# Patient Record
Sex: FEMALE | Race: WHITE | NOT HISPANIC OR LATINO
[De-identification: names, ages, dates, MRNs, and addresses within clinical notes are randomized per-mention and may not be internally consistent; named-entity substitution may affect disease eponyms.]

---

## 2020-06-25 ENCOUNTER — TRANSCRIPTION ENCOUNTER (OUTPATIENT)
Age: 70
End: 2020-06-25

## 2020-07-28 ENCOUNTER — TRANSCRIPTION ENCOUNTER (OUTPATIENT)
Age: 70
End: 2020-07-28

## 2022-09-05 ENCOUNTER — EMERGENCY (EMERGENCY)
Facility: HOSPITAL | Age: 72
LOS: 0 days | Discharge: HOME | End: 2022-09-05
Attending: EMERGENCY MEDICINE
Payer: MEDICARE

## 2022-09-05 VITALS — WEIGHT: 117.07 LBS

## 2022-09-05 VITALS
HEART RATE: 64 BPM | OXYGEN SATURATION: 100 % | DIASTOLIC BLOOD PRESSURE: 84 MMHG | SYSTOLIC BLOOD PRESSURE: 161 MMHG | TEMPERATURE: 99 F | RESPIRATION RATE: 18 BRPM

## 2022-09-05 DIAGNOSIS — G43.909 MIGRAINE, UNSPECIFIED, NOT INTRACTABLE, WITHOUT STATUS MIGRAINOSUS: ICD-10-CM

## 2022-09-05 DIAGNOSIS — H54.7 UNSPECIFIED VISUAL LOSS: ICD-10-CM

## 2022-09-05 DIAGNOSIS — Z86.16 PERSONAL HISTORY OF COVID-19: ICD-10-CM

## 2022-09-05 DIAGNOSIS — I10 ESSENTIAL (PRIMARY) HYPERTENSION: ICD-10-CM

## 2022-09-05 LAB
ALBUMIN SERPL ELPH-MCNC: 3.9 G/DL — SIGNIFICANT CHANGE UP (ref 3.3–5)
ALP SERPL-CCNC: 54 U/L — SIGNIFICANT CHANGE UP (ref 40–120)
ALT FLD-CCNC: 18 U/L — SIGNIFICANT CHANGE UP (ref 12–78)
ANION GAP SERPL CALC-SCNC: 5 MMOL/L — SIGNIFICANT CHANGE UP (ref 5–17)
AST SERPL-CCNC: 16 U/L — SIGNIFICANT CHANGE UP (ref 15–37)
BASOPHILS # BLD AUTO: 0.05 K/UL — SIGNIFICANT CHANGE UP (ref 0–0.2)
BASOPHILS NFR BLD AUTO: 0.6 % — SIGNIFICANT CHANGE UP (ref 0–2)
BILIRUB SERPL-MCNC: 0.5 MG/DL — SIGNIFICANT CHANGE UP (ref 0.2–1.2)
BUN SERPL-MCNC: 20 MG/DL — SIGNIFICANT CHANGE UP (ref 7–23)
CALCIUM SERPL-MCNC: 9.6 MG/DL — SIGNIFICANT CHANGE UP (ref 8.5–10.1)
CHLORIDE SERPL-SCNC: 108 MMOL/L — SIGNIFICANT CHANGE UP (ref 96–108)
CO2 SERPL-SCNC: 27 MMOL/L — SIGNIFICANT CHANGE UP (ref 22–31)
CREAT SERPL-MCNC: 0.77 MG/DL — SIGNIFICANT CHANGE UP (ref 0.5–1.3)
EGFR: 81 ML/MIN/1.73M2 — SIGNIFICANT CHANGE UP
EOSINOPHIL # BLD AUTO: 0.16 K/UL — SIGNIFICANT CHANGE UP (ref 0–0.5)
EOSINOPHIL NFR BLD AUTO: 2.1 % — SIGNIFICANT CHANGE UP (ref 0–6)
GLUCOSE SERPL-MCNC: 88 MG/DL — SIGNIFICANT CHANGE UP (ref 70–99)
HCT VFR BLD CALC: 39.4 % — SIGNIFICANT CHANGE UP (ref 34.5–45)
HGB BLD-MCNC: 12.9 G/DL — SIGNIFICANT CHANGE UP (ref 11.5–15.5)
IMM GRANULOCYTES NFR BLD AUTO: 0.3 % — SIGNIFICANT CHANGE UP (ref 0–1.5)
LYMPHOCYTES # BLD AUTO: 2.22 K/UL — SIGNIFICANT CHANGE UP (ref 1–3.3)
LYMPHOCYTES # BLD AUTO: 28.8 % — SIGNIFICANT CHANGE UP (ref 13–44)
MCHC RBC-ENTMCNC: 29.6 PG — SIGNIFICANT CHANGE UP (ref 27–34)
MCHC RBC-ENTMCNC: 32.7 GM/DL — SIGNIFICANT CHANGE UP (ref 32–36)
MCV RBC AUTO: 90.4 FL — SIGNIFICANT CHANGE UP (ref 80–100)
MONOCYTES # BLD AUTO: 0.55 K/UL — SIGNIFICANT CHANGE UP (ref 0–0.9)
MONOCYTES NFR BLD AUTO: 7.1 % — SIGNIFICANT CHANGE UP (ref 2–14)
NEUTROPHILS # BLD AUTO: 4.7 K/UL — SIGNIFICANT CHANGE UP (ref 1.8–7.4)
NEUTROPHILS NFR BLD AUTO: 61.1 % — SIGNIFICANT CHANGE UP (ref 43–77)
PLATELET # BLD AUTO: 214 K/UL — SIGNIFICANT CHANGE UP (ref 150–400)
POTASSIUM SERPL-MCNC: 3.8 MMOL/L — SIGNIFICANT CHANGE UP (ref 3.5–5.3)
POTASSIUM SERPL-SCNC: 3.8 MMOL/L — SIGNIFICANT CHANGE UP (ref 3.5–5.3)
PROT SERPL-MCNC: 7 GM/DL — SIGNIFICANT CHANGE UP (ref 6–8.3)
RBC # BLD: 4.36 M/UL — SIGNIFICANT CHANGE UP (ref 3.8–5.2)
RBC # FLD: 12.7 % — SIGNIFICANT CHANGE UP (ref 10.3–14.5)
SODIUM SERPL-SCNC: 140 MMOL/L — SIGNIFICANT CHANGE UP (ref 135–145)
TROPONIN I, HIGH SENSITIVITY RESULT: 7.3 NG/L — SIGNIFICANT CHANGE UP
WBC # BLD: 7.7 K/UL — SIGNIFICANT CHANGE UP (ref 3.8–10.5)
WBC # FLD AUTO: 7.7 K/UL — SIGNIFICANT CHANGE UP (ref 3.8–10.5)

## 2022-09-05 PROCEDURE — 84484 ASSAY OF TROPONIN QUANT: CPT

## 2022-09-05 PROCEDURE — 36415 COLL VENOUS BLD VENIPUNCTURE: CPT

## 2022-09-05 PROCEDURE — 93005 ELECTROCARDIOGRAM TRACING: CPT

## 2022-09-05 PROCEDURE — 93010 ELECTROCARDIOGRAM REPORT: CPT

## 2022-09-05 PROCEDURE — 85025 COMPLETE CBC W/AUTO DIFF WBC: CPT

## 2022-09-05 PROCEDURE — 99284 EMERGENCY DEPT VISIT MOD MDM: CPT

## 2022-09-05 PROCEDURE — 80053 COMPREHEN METABOLIC PANEL: CPT

## 2022-09-05 RX ORDER — ACETAMINOPHEN 500 MG
650 TABLET ORAL ONCE
Refills: 0 | Status: COMPLETED | OUTPATIENT
Start: 2022-09-05 | End: 2022-09-05

## 2022-09-05 RX ADMIN — Medication 650 MILLIGRAM(S): at 19:55

## 2022-09-05 NOTE — ED PROVIDER NOTE - CARE PROVIDER_API CALL
Destinee Rinaldi (MD)  Clinical Neurophysiology; EEGEpilepsy; Neurology; Sleep Medicine  [address] [facility], [place], [place] [address]  Phone: [phone]  Fax: [phone]  Follow Up Time:

## 2022-09-05 NOTE — ED ADULT NURSE NOTE - OBJECTIVE STATEMENT
pt presents to ED for ocular migraine since 1530 x 1 hour. returned PTA. associated bilateral vision changes, R sided numbness. resolved at this time.    of migraines, HTN presents to the ED c/o ocular migraine that started at 3:30 PM today and lasted an hour and came back later. Pt described headache as pain to the front of the forehead and additionally c/o bilateral vision loss and numbness to the right side that has since resolved. came to ED for evaluation since her ocular migraines usually resolve in 30min. took ibprofen PTA.

## 2022-09-05 NOTE — ED PROVIDER NOTE - OBJECTIVE STATEMENT
73 y/o female with PMHx of migraines, HTN presents to the ED c/o ocular migraine that started at 3:30 PM today and lasted an hour and came back later. Pt described headache as pain to the front of the forehead and additionally c/o bilateral vision loss and numbness to the right side that has since resolved. Pt states that she has experienced ocular migraines before but they usually resolve within 30 minutes. Pt denies SOB, chest pain, and weakness in arms/legs. She took Ibuprofen PTA. She takes 2.5 mg ? for HTN. Pt had COVID a month ago and at the hospital had BP of 168, has been observing since then.

## 2022-09-05 NOTE — ED PROVIDER NOTE - NSFOLLOWUPINSTRUCTIONS_ED_ALL_ED_FT
Acute Headache    WHAT YOU NEED TO KNOW:    An acute headache is pain or discomfort that may start suddenly and get worse quickly. You may have an acute headache only when you feel stress or eat certain foods. Other acute headache pain can happen every day, and sometimes several times a day.     DISCHARGE INSTRUCTIONS:    Return to the emergency department if:   •You have severe pain.      •You have numbness or weakness on one side of your face or body.      •You have a headache that occurs after a blow to the head, a fall, or other trauma.       •You have a headache, are forgetful or confused, or have trouble speaking.      •You have a headache, stiff neck, and a fever.      Call your doctor if:   •You have a constant headache and are vomiting.      •You have a headache each day that does not get better, even after treatment.      •You have changes in your headaches, or new symptoms that occur when you have a headache.      •You have questions or concerns about your condition or care.      Medicines: You may need any of the following:   •Prescription pain medicine may be given. The medicine your healthcare provider recommends will depend on the kind of headaches you have. You will need to take prescription headache medicines as directed to prevent a problem called rebound headache. These headaches happen with regular use of pain relievers for headache disorders.      •NSAIDs, such as ibuprofen, help decrease swelling, pain, and fever. This medicine is available with or without a doctor's order. NSAIDs can cause stomach bleeding or kidney problems in certain people. If you take blood thinner medicine, always ask your healthcare provider if NSAIDs are safe for you. Always read the medicine label and follow directions.      •Acetaminophen decreases pain and fever. It is available without a doctor's order. Ask how much to take and how often to take it. Follow directions. Read the labels of all other medicines you are using to see if they also contain acetaminophen, or ask your doctor or pharmacist. Acetaminophen can cause liver damage if not taken correctly.      •Antidepressants may be given for some kinds of headaches.      •Take your medicine as directed. Contact your healthcare provider if you think your medicine is not helping or if you have side effects. Tell your provider if you are allergic to any medicine. Keep a list of the medicines, vitamins, and herbs you take. Include the amounts, and when and why you take them. Bring the list or the pill bottles to follow-up visits. Carry your medicine list with you in case of an emergency.      Manage your symptoms:   •Apply heat or ice on the headache area. Use a heat or ice pack. For an ice pack, you can also put crushed ice in a plastic bag. Cover the pack or bag with a towel before you apply it to your skin. Ice and heat both help decrease pain, and heat also helps decrease muscle spasms. Apply heat for 20 to 30 minutes every 2 hours. Apply ice for 15 to 20 minutes every hour. Apply heat or ice for as long and for as many days as directed. You may alternate heat and ice.      •Relax your muscles. Lie down in a comfortable position and close your eyes. Relax your muscles slowly. Start at your toes and work your way up your body.      •Keep a record of your headaches. Write down when your headaches start and stop. Include your symptoms and what you were doing when the headache began. Record what you ate or drank for 24 hours before the headache started. Describe the pain and where it hurts. Keep track of what you did to treat your headache and if it worked.       Prevent an acute headache:   •Avoid anything that triggers an acute headache. Examples include exposure to chemicals, going to high altitude, or not getting enough sleep. Create a regular sleep routine. Go to sleep at the same time and wake up at the same time each day. Do not use electronic devices before bedtime. These may trigger a headache or prevent you from sleeping well.      •Do not smoke. Nicotine and other chemicals in cigarettes and cigars can trigger an acute headache or make it worse. Ask your healthcare provider for information if you currently smoke and need help to quit. E-cigarettes or smokeless tobacco still contain nicotine. Talk to your healthcare provider before you use these products.       •Limit alcohol as directed. Alcohol can trigger an acute headache or make it worse. If you have cluster headaches, do not drink alcohol during an episode. For other types of headaches, ask your healthcare provider if it is safe for you to drink alcohol. Ask how much is safe for you to drink, and how often.      •Exercise as directed. Exercise can reduce tension and help with headache pain. Aim for 30 minutes of physical activity on most days of the week. Your healthcare provider can help you create an exercise plan.      •Eat a variety of healthy foods. Healthy foods include fruits, vegetables, low-fat dairy products, lean meats, fish, whole grains, and cooked beans. Your healthcare provider or dietitian can help you create meals plans if you need to avoid foods that trigger headaches.      Follow up with your healthcare provider as directed: Bring your headache record with you when you see your healthcare provider. Write down your questions so you remember to ask them during your visits.       © Copyright [employer] 2022           back to top                          © Copyright [employer] 2022

## 2022-09-05 NOTE — ED PROVIDER NOTE - PATIENT PORTAL LINK FT
You can access the FollowMyHealth Patient Portal offered by [facility] by registering at the following website: [email or web address]. By joining "[employer]’s FollowMyHealth portal, you will also be able to view your health information using other applications (apps) compatible with our system.

## 2022-09-05 NOTE — ED ADULT TRIAGE NOTE - CHIEF COMPLAINT QUOTE
Pt c/o "ocular migraine" that started one hour PTA. pt states she normally has these types of migraines but normally subside within 15 mins. this times it feels different as she had Bilateral hand numbness and lost half her vision. vision and numbness have now improved. MD called for code stroke eval, no code stroke needed at present.

## 2022-09-05 NOTE — ED PROVIDER NOTE - CLINICAL SUMMARY MEDICAL DECISION MAKING FREE TEXT BOX
Patient declined head imaging.  No neuro deficits on exam.  Labs and EKG WNL.  Patient well on reeval just asking for tylenol for headache.  Likely migraine variant.  D/c home with neurology follow up.  Return precautions given.

## 2022-09-08 PROBLEM — G43.909 MIGRAINE, UNSPECIFIED, NOT INTRACTABLE, WITHOUT STATUS MIGRAINOSUS: Chronic | Status: ACTIVE | Noted: 2022-09-05

## 2022-09-08 PROBLEM — I10 ESSENTIAL (PRIMARY) HYPERTENSION: Chronic | Status: ACTIVE | Noted: 2022-09-05

## 2022-09-28 ENCOUNTER — APPOINTMENT (OUTPATIENT)
Dept: ORTHOPEDIC SURGERY | Facility: CLINIC | Age: 72
End: 2022-09-28

## 2022-10-12 ENCOUNTER — APPOINTMENT (OUTPATIENT)
Dept: ORTHOPEDIC SURGERY | Facility: CLINIC | Age: 72
End: 2022-10-12

## 2022-10-12 VITALS — WEIGHT: 124 LBS | BODY MASS INDEX: 21.97 KG/M2 | HEIGHT: 63 IN

## 2022-10-12 DIAGNOSIS — Z78.9 OTHER SPECIFIED HEALTH STATUS: ICD-10-CM

## 2022-10-12 PROCEDURE — 20611 DRAIN/INJ JOINT/BURSA W/US: CPT | Mod: RT

## 2022-10-12 PROCEDURE — 99214 OFFICE O/P EST MOD 30 MIN: CPT | Mod: 25

## 2022-10-16 NOTE — HISTORY OF PRESENT ILLNESS
[de-identified] : patient is here for  a follow up Visit of the right shoulder. pt had Synvisc one injection in the right shoulder on 11/6/21 and it helped with the pain.  the pain in the  right shoulders started two month ago  again. pt feels the pain the  most at night, pt can't sleep on her right side due to  the pain in the right shoulder.pt has been taking ibuprofen as needed.

## 2022-10-16 NOTE — PROCEDURE
[Large Joint Injection] : Large joint injection [Right] : of the right [Glenohumeral Joint] : glenohumeral joint [Inflammation] : inflammation [X-ray evidence of Osteoarthritis on this or prior visit] : x-ray evidence of Osteoarthritis on this or prior visit [Betadine] : betadine [Ethyl Chloride sprayed topically] : ethyl chloride sprayed topically [Sterile technique used] : sterile technique used [Synvisc] : Synvisc [] : Patient tolerated procedure well [Call if redness, pain or fever occur] : call if redness, pain or fever occur [Apply ice for 15min out of every hour for the next 12-24 hours as tolerated] : apply ice for 15 minutes out of every hour for the next 12-24 hours as tolerated [Previous OTC use and PT nontherapeutic] : patient has tried OTC's including aspirin, Ibuprofen, Aleve, etc or prescription NSAIDS, and/or exercises at home and/or physical therapy without satisfactory response [Risks, benefits, alternatives discussed / Verbal consent obtained] : the risks benefits, and alternatives have been discussed, and verbal consent was obtained [Glenohmeral injection] : glenohumeral injection [Precise injection in area of tear] : precise injection in area of tear [All ultrasound images have been permanently captured and stored accordingly in our picture archiving and communication system] : All ultrasound images have been permanently captured and stored accordingly in our picture archiving and communication system [Visualization of the needle and placement of injection was performed without complication] : visualization of the needle and placement of injection was performed without complication [Pain] : pain [#3] : series #3

## 2022-10-16 NOTE — DISCUSSION/SUMMARY
[Medication Risks Reviewed] : Medication risks reviewed [de-identified] : evaluated Shoulder and decided to proceed with synvisc ONE injections, discussed future treatment and option, will proceed, discussed possible surgery vs alternatives in future\par discussed risks of benefits of shoudler replacement and this is what she really needs, discussed other surgical and non surgical options and use of nsaids\par prescribed nsaids and discussed risks of side effects and timing and management of medication.  side effects can include gi ulcers and irritation as well as kidney failure and bleeding issues\par \par The risks, benefits and contents of the injection have been discussed.  Risks include but are not limited to allergic reaction, flare reaction, permanent white skin discoloration at the injection site and infection.  The patient understands the risks and agrees to having the injection.  All questions have been answered.\par Synvisc ONE RT shoulder- Discussed the timing of the injections and the follow up that is needed. Advised the pt to ice the area that was injected and informed the pt it may take a few days for the injection to give relief.\par \par advised the patient if she needs a steroid injection down in [place] she can

## 2022-10-16 NOTE — PHYSICAL EXAM
[Right] : right shoulder [NL (0-180)] : full active abduction 0-180 degrees [NL (0-70)] : full internal rotation 0-70 degrees [NL (0-90)] : full external rotation 0-90 degrees [Sitting] : sitting [] : negative Erika

## 2022-11-25 NOTE — ED PROVIDER NOTE - SOCIAL CONCERNS
None Denies alcohol use, tobacco use, or other recreational drug use. Reports hx of secondhand smoke exposure (father was a heavy smoker).

## 2023-05-17 ENCOUNTER — APPOINTMENT (OUTPATIENT)
Dept: ORTHOPEDIC SURGERY | Facility: CLINIC | Age: 73
End: 2023-05-17
Payer: MEDICARE

## 2023-05-17 VITALS — HEIGHT: 63 IN | BODY MASS INDEX: 21.97 KG/M2 | WEIGHT: 124 LBS

## 2023-05-17 DIAGNOSIS — M19.011 PRIMARY OSTEOARTHRITIS, RIGHT SHOULDER: ICD-10-CM

## 2023-05-17 DIAGNOSIS — M19.019 PRIMARY OSTEOARTHRITIS, UNSPECIFIED SHOULDER: ICD-10-CM

## 2023-05-17 DIAGNOSIS — I10 ESSENTIAL (PRIMARY) HYPERTENSION: ICD-10-CM

## 2023-05-17 DIAGNOSIS — M75.81 OTHER SHOULDER LESIONS, RIGHT SHOULDER: ICD-10-CM

## 2023-05-17 DIAGNOSIS — M75.51 BURSITIS OF RIGHT SHOULDER: ICD-10-CM

## 2023-05-17 DIAGNOSIS — M75.42 IMPINGEMENT SYNDROME OF LEFT SHOULDER: ICD-10-CM

## 2023-05-17 DIAGNOSIS — M75.82 OTHER SHOULDER LESIONS, LEFT SHOULDER: ICD-10-CM

## 2023-05-17 PROCEDURE — J3490M: CUSTOM | Mod: NC

## 2023-05-17 PROCEDURE — 72040 X-RAY EXAM NECK SPINE 2-3 VW: CPT

## 2023-05-17 PROCEDURE — 20611 DRAIN/INJ JOINT/BURSA W/US: CPT | Mod: LT

## 2023-05-17 PROCEDURE — 73030 X-RAY EXAM OF SHOULDER: CPT | Mod: 50

## 2023-05-17 PROCEDURE — 99214 OFFICE O/P EST MOD 30 MIN: CPT | Mod: 25

## 2023-05-24 PROBLEM — M75.51 BURSITIS OF RIGHT SHOULDER: Status: ACTIVE | Noted: 2022-10-16

## 2023-05-24 PROBLEM — I10 HIGH BLOOD PRESSURE: Status: ACTIVE | Noted: 2022-10-12

## 2023-05-24 PROBLEM — M75.42 IMPINGEMENT SYNDROME OF LEFT SHOULDER: Status: ACTIVE | Noted: 2023-05-17

## 2023-05-24 NOTE — PHYSICAL EXAM
[Right] : right shoulder [Left] : left shoulder [Sitting] : sitting [Mild] : mild [] : no deformity [de-identified] : 4+/5 throughout  [de-identified] : +X-Arm adduction  [TWNoteComboBox7] : active forward flexion 150 degrees [de-identified] : active abduction 150 degrees [TWNoteComboBox6] : internal rotation 45 degrees [de-identified] : external rotation 40 degrees

## 2023-05-24 NOTE — DISCUSSION/SUMMARY
[Medication Risks Reviewed] : Medication risks reviewed [Surgical risks reviewed] : Surgical risks reviewed [de-identified] : \par Patient reports minimal improvement from previous Synvisc series - she had a CSI in [place] (2/23) providing no relief. \par Discussed treatment options both operative ve non-operative for her right shoulder large subacromial bone spur including subacromial decompression vs injection therapy. \par She has subacromial bone spurs on left shoulder as well, however not as severe as right shoulder. \par Treatment options are limited considering the patients degenerative nature of both shoulders. Discussed possible total shoulder, however due to risks and high morbidity patient elects to hold off. \par The patient elects to proceed with subacromial decompress, however will delay if we can as higher risk at her age\par Discussed treatment options in the interm to aid in pain/inflammation in the form of injection therapy. Patient elected to receive bilateral shoulder subacromial 9/2 under US guidance. \par She will follow up for possible booking of subacromial decompression. \par \par The risks, benefits and contents of the injection have been discussed.  Risks include but are not limited to allergic reaction, flare reaction, permanent white skin discoloration at the injection site and infection.  The patient understands the risks and agrees to having the injection.  All questions have been answered.\par \par Discussed the risks of recurrent tears as well as risk of progression of occult or existing arthritis, avn or chondrolysis. Discussed the process of arthroscopy including risk, benefits and alternatives.  The risks include, but are not limited to infection, bleeding, injury to small nerves and blood vessels, pain, stiffness, phlebitis, DVT and need for secondary procedures.  Preoperative, intraoperative and postoperative care were discussed and outlined to the patient as well. \par \par The risks and benefits of surgery have been discussed. Risks include but are not limited to bleeding, infection, reaction to anesthesia, injury to blood vessels and nerves, malunion, nonunion, DVT, PE, necessity of repeat surgery, chronic pain, loss of limb and death. The patient understands the risks and agrees with the surgical plan. All questions have been answered. \par \par

## 2023-05-24 NOTE — HISTORY OF PRESENT ILLNESS
[de-identified] : Patient is here for bilateral shoulders. Last had Synvisc one in right shoulder on 10/12/22. Went to [place], and in 2/2023, had CSI - felt no relief. Inquiring about left shoulder, as we were treating in 2012 for RTC tear. Tried CSI and PT. Noticed pain began in 1/2023. No injury, and ROM is limited.

## 2023-05-24 NOTE — PROCEDURE
[Large Joint Injection] : Large joint injection [Bilateral] : bilaterally of the [Subacromial Space] : subacromial space [X-ray evidence of Osteoarthritis on this or prior visit] : x-ray evidence of Osteoarthritis on this or prior visit [Betadine] : betadine [Ethyl Chloride sprayed topically] : ethyl chloride sprayed topically [Sterile technique used] : sterile technique used [Call if redness, pain or fever occur] : call if redness, pain or fever occur [Apply ice for 15min out of every hour for the next 12-24 hours as tolerated] : apply ice for 15 minutes out of every hour for the next 12-24 hours as tolerated [Patient was advised to rest the joint(s) for ____ days] : patient was advised to rest the joint(s) for [unfilled] days [Previous OTC use and PT nontherapeutic] : patient has tried OTC's including aspirin, Ibuprofen, Aleve, etc or prescription NSAIDS, and/or exercises at home and/or physical therapy without satisfactory response [Risks, benefits, alternatives discussed / Verbal consent obtained] : the risks benefits, and alternatives have been discussed, and verbal consent was obtained [Prior failure or difficult injection] : prior failure or difficult injection [All ultrasound images have been permanently captured and stored accordingly in our picture archiving and communication system] : All ultrasound images have been permanently captured and stored accordingly in our picture archiving and communication system [Visualization of the needle and placement of injection was performed without complication] : visualization of the needle and placement of injection was performed without complication [Pain] : pain [Arthritis] : arthritis [FreeTextEntry1] : Bilateral shoulder subacromial 9/2 under US guidance  [FreeTextEntry2] : s [FreeTextEntry3] : Large joint injection was performed of bilateral shoulders to the subacromial space. Injections of Bupivacaine (Marcaine) cc of 0.5% was used Betamethasone (Celestone) cc of 6mg. \par Patient was advised to call if redness, pain or fever occur and apply ice for 15 minutes out of every hour for the next 12-24 hours as tolerated. \par \par Patient has tried OTC's including aspirin, Ibuprofen, Aleve, etc or prescription NSAIDS, and/or exercises at home and/or physical therapy without satisfactory response and the risks benefits, and alternatives have been discussed, and verbal consent was obtained. \par Ultrasound guidance was indicated for this patient due to precise injection in area of tear. All ultrasound images have been permanently captured and stored accordingly in our picture archiving and communication system. Visualization of the needle and placement of injection was performed without complication. \par The findings showed no evidence of ligament, tendon or muscle tear and no effusion or other fluid collection.

## 2023-08-20 ENCOUNTER — NON-APPOINTMENT (OUTPATIENT)
Age: 73
End: 2023-08-20

## 2023-09-22 ENCOUNTER — NON-APPOINTMENT (OUTPATIENT)
Age: 73
End: 2023-09-22

## 2023-09-22 ENCOUNTER — APPOINTMENT (OUTPATIENT)
Dept: OTOLARYNGOLOGY | Facility: CLINIC | Age: 73
End: 2023-09-22
Payer: MEDICARE

## 2023-09-22 VITALS — BODY MASS INDEX: 21.62 KG/M2 | WEIGHT: 122 LBS | HEIGHT: 63 IN

## 2023-09-22 DIAGNOSIS — H92.01 OTALGIA, RIGHT EAR: ICD-10-CM

## 2023-09-22 DIAGNOSIS — G52.1 DISORDERS OF GLOSSOPHARYNGEAL NERVE: ICD-10-CM

## 2023-09-22 DIAGNOSIS — H93.90 UNSPECIFIED DISORDER OF EAR, UNSPECIFIED EAR: ICD-10-CM

## 2023-09-22 DIAGNOSIS — J31.2 CHRONIC PHARYNGITIS: ICD-10-CM

## 2023-09-22 DIAGNOSIS — Z87.891 PERSONAL HISTORY OF NICOTINE DEPENDENCE: ICD-10-CM

## 2023-09-22 DIAGNOSIS — Z78.9 OTHER SPECIFIED HEALTH STATUS: ICD-10-CM

## 2023-09-22 PROCEDURE — 31575 DIAGNOSTIC LARYNGOSCOPY: CPT

## 2023-09-22 PROCEDURE — 99203 OFFICE O/P NEW LOW 30 MIN: CPT | Mod: 25

## 2024-05-17 ENCOUNTER — OFFICE (OUTPATIENT)
Dept: URBAN - METROPOLITAN AREA CLINIC 102 | Facility: CLINIC | Age: 74
Setting detail: OPHTHALMOLOGY
End: 2024-05-17
Payer: MEDICARE

## 2024-05-17 ENCOUNTER — RX ONLY (RX ONLY)
Age: 74
End: 2024-05-17

## 2024-05-17 DIAGNOSIS — H52.4: ICD-10-CM

## 2024-05-17 DIAGNOSIS — H33.311: ICD-10-CM

## 2024-05-17 DIAGNOSIS — H43.392: ICD-10-CM

## 2024-05-17 DIAGNOSIS — H43.811: ICD-10-CM

## 2024-05-17 DIAGNOSIS — G43.109: ICD-10-CM

## 2024-05-17 DIAGNOSIS — H25.13: ICD-10-CM

## 2024-05-17 PROBLEM — H52.7 REFRACTIVE ERROR: Status: ACTIVE | Noted: 2024-05-17

## 2024-05-17 PROCEDURE — 92250 FUNDUS PHOTOGRAPHY W/I&R: CPT | Performed by: OPHTHALMOLOGY

## 2024-05-17 PROCEDURE — 92015 DETERMINE REFRACTIVE STATE: CPT | Performed by: OPHTHALMOLOGY

## 2024-05-17 PROCEDURE — 92020 GONIOSCOPY: CPT | Performed by: OPHTHALMOLOGY

## 2024-05-17 PROCEDURE — 99204 OFFICE O/P NEW MOD 45 MIN: CPT | Performed by: OPHTHALMOLOGY

## 2024-05-17 ASSESSMENT — CONFRONTATIONAL VISUAL FIELD TEST (CVF)
OS_FINDINGS: FULL
OD_FINDINGS: FULL

## 2024-08-02 ENCOUNTER — NON-APPOINTMENT (OUTPATIENT)
Age: 74
End: 2024-08-02

## 2024-10-15 ENCOUNTER — OFFICE (OUTPATIENT)
Dept: URBAN - METROPOLITAN AREA CLINIC 102 | Facility: CLINIC | Age: 74
Setting detail: OPHTHALMOLOGY
End: 2024-10-15
Payer: MEDICARE

## 2024-10-15 DIAGNOSIS — H52.4: ICD-10-CM

## 2024-10-15 PROCEDURE — GLASS CHEC GLASS RECHECK/ NO CHARGE: Performed by: OPHTHALMOLOGY

## 2024-10-15 ASSESSMENT — REFRACTION_AUTOREFRACTION
OS_SPHERE: +2.00
OD_SPHERE: +2.25
OD_AXIS: 104
OD_CYLINDER: -1.00
OS_AXIS: 043
OS_CYLINDER: -0.75

## 2024-10-15 ASSESSMENT — REFRACTION_CURRENTRX
OD_SPHERE: +1.75
OS_SPHERE: +1.25
OS_SPHERE: +2.00
OS_CYLINDER: SPH
OD_CYLINDER: -0.75
OD_SPHERE: +2.00
OS_OVR_VA: 20/
OS_AXIS: 050
OD_ADD: +2.50
OD_AXIS: 095
OD_OVR_VA: 20/
OD_AXIS: 109
OS_ADD: +2.50
OS_OVR_VA: 20/
OD_ADD: +2.50
OD_OVR_VA: 20/
OS_CYLINDER: -1.00
OS_ADD: +2.50
OD_CYLINDER: -1.25

## 2024-10-15 ASSESSMENT — REFRACTION_MANIFEST
OD_ADD: +2.50
OS_SPHERE: +1.25
OS_CYLINDER: -1.00
OS_ADD: +2.50
OS_VA1: 20/25
OS_CYLINDER: SPH
OD_SPHERE: +1.75
OD_VA1: 20/40
OD_CYLINDER: -1.25
OS_SPHERE: +2.00
OD_AXIS: 105
OD_ADD: +2.50
OD_SPHERE: +1.75
OD_AXIS: 105
OS_ADD: +2.50
OS_VA1: 20/30
OD_CYLINDER: -1.25
OD_VA1: 20/25
OS_AXIS: 050

## 2024-10-15 ASSESSMENT — KERATOMETRY
METHOD_AUTO_MANUAL: AUTO
OD_K2POWER_DIOPTERS: 45.25
OS_K1POWER_DIOPTERS: 45.75
OD_AXISANGLE_DEGREES: 038
OD_K1POWER_DIOPTERS: 45.00
OS_AXISANGLE_DEGREES: 119
OS_K2POWER_DIOPTERS: 46.00

## 2024-10-15 ASSESSMENT — CONFRONTATIONAL VISUAL FIELD TEST (CVF)
OD_FINDINGS: FULL
OS_FINDINGS: FULL

## 2024-10-15 ASSESSMENT — VISUAL ACUITY
OD_BCVA: 20/20-1
OS_BCVA: 20/25-1

## 2024-10-15 ASSESSMENT — TONOMETRY
OD_IOP_MMHG: 12
OS_IOP_MMHG: 14